# Patient Record
(demographics unavailable — no encounter records)

---

## 2025-05-23 NOTE — PHYSICAL EXAM
[Well Developed] : well developed [Well Nourished] : well nourished [Normal Conjunctiva] : normal conjunctiva [Normal Venous Pressure] : normal venous pressure [Carotid Bruit] : carotid bruit [Normal S1, S2] : normal S1, S2 [No Rub] : no rub [Normal] : no edema, no cyanosis, no clubbing, no varicosities [de-identified] : poor hearing  [de-identified] : 2/6 apical yasmin

## 2025-05-23 NOTE — REVIEW OF SYSTEMS
[Chest Discomfort] : chest discomfort [Negative] : Genitourinary [Headache] : no headache [Feeling Fatigued] : not feeling fatigued [Earache] : no earache [Sore Throat] : no sore throat [SOB] : no shortness of breath [Dyspnea on exertion] : not dyspnea during exertion [Leg Claudication] : no intermittent leg claudication [Orthopnea] : no orthopnea

## 2025-05-23 NOTE — DISCUSSION/SUMMARY
[EKG obtained to assist in diagnosis and management of assessed problem(s)] : EKG obtained to assist in diagnosis and management of assessed problem(s) [FreeTextEntry1] :  Summary : Given the atypical presentation of chest pain in a patient with multiple cardiovascular risk factors, further evaluation is necessary to rule out cardiac causes and identify the underlying etiology of the chest discomfort. - Plan : Reassurance  Lvef 55% 1-2 mr 1- 2 aI  Continue meds for HTN AODM and LIPIDS  f/u heme onc for any possible anemia

## 2025-05-23 NOTE — ADDENDUM
[FreeTextEntry1] : Fernie Billings assisted in documentation on May 23 2025 acting as a scribe for Dr. Andrea Wells.

## 2025-05-23 NOTE — ASSESSMENT
[FreeTextEntry1] : Assessment: - Summary : 83-year-old male with multiple myeloma, hypertension, and diabetes presenting with atypical chest pain. The pain occurs primarily at rest and lasts for several hours, which is not typical for cardiac ischemia. However, given the patient's age and risk factors, cardiac causes cannot be ruled out without further investigation. - Problems : - Atypical chest pain  - Multiple myeloma  - Hypertension  - Diabetes  - History of smoking  - Differential Diagnosis :

## 2025-07-02 NOTE — DISCUSSION/SUMMARY
[FreeTextEntry1] :  Summary : Given the atypical presentation of chest pain in a patient with multiple cardiovascular risk factors, further evaluation is necessary to rule out cardiac causes and identify the underlying etiology of the chest discomfort. - Plan : Reassurance  Lvef 55% 1-2 mr 1- 2 aI  Continue meds for HTN AODM and LIPIDS  f/u heme onc for any possible anemia   [EKG obtained to assist in diagnosis and management of assessed problem(s)] : EKG obtained to assist in diagnosis and management of assessed problem(s)

## 2025-07-02 NOTE — ADDENDUM
[FreeTextEntry1] : Fernie Billings assisted in documentation on Jul 2 2025 acting as a scribe for Dr. Andrea Wells.

## 2025-07-02 NOTE — PHYSICAL EXAM
[Well Developed] : well developed [Well Nourished] : well nourished [Normal Conjunctiva] : normal conjunctiva [Normal Venous Pressure] : normal venous pressure [Carotid Bruit] : carotid bruit [Normal S1, S2] : normal S1, S2 [No Rub] : no rub [Normal] : no edema, no cyanosis, no clubbing, no varicosities [de-identified] : poor hearing  [de-identified] : 2/6 apical yasmin

## 2025-07-02 NOTE — HISTORY OF PRESENT ILLNESS
[FreeTextEntry1] : 5/23/25 MABEL BRIGGS is 83 year he with prior h/o   (+ ) HTN  (+ ) AODM  (- ) smoker  (+ ) lipids (- ) obesity -( ) fam hx of CAD  Subjective: - Summary : 83-year-old male with multiple myeloma, hypertension, and diabetes presenting with chest pain. Patient reports chest discomfort primarily when sitting, lasting for a few hours. No chest pain with exertion. Patient has hearing difficulties. - Chief Complaint (CC) : Chest pain - History of Present Illness (HPI) : The patient is an 83-year-old male with a history of multiple myeloma, hypertension, and diabetes who presents with complaints of chest pain. The chest discomfort occurs primarily when sitting, not associated with physical activity or exertion. The pain typically lasts for a few hours. The patient denies worsening of pain with deep breathing. He reports being able to walk without experiencing chest discomfort. - Past Medical History : - Multiple myeloma  - Hypertension  - Diabetes  - Prostate condition (unspecified)  - Thyroid condition (unspecified)  - Past Surgical History : No reported surgical history - Family History : No reported family history of heart disease - Social History : Former smoker, quit years ago - Review of Systems : Positive for chest pain when sitting. Negative for chest pain with exertion or deep breathing. - Medications : - Velcade injection  - Dexamethasone weekly  - Immunoglobulin  - Iron supplement  - Melatonin  - Darzalex  - Unspecified medications for prostate, thyroid, blood pressure, cholesterol, and diabetes  - Allergies : Allergic reaction to Revlimid (no longer taking) Objective: - Diagnostic Results : EKG Normal - Vital Signs : Not provided in the conversation - Physical Examination (PE) : Not provided in the conversation Assessment: - Summary : 83-year-old male with multiple myeloma, hypertension, and diabetes presenting with atypical chest pain. The pain occurs primarily at rest and lasts for several hours, which is not typical for cardiac ischemia. However, given the patient's age and risk factors, cardiac causes cannot be ruled out without further investigation. - Problems : - Atypical chest pain  - Multiple myeloma  - Hypertension  - Diabetes  - History of smoking  - Differential Diagnosis : - Musculoskeletal chest pain  - Stable angina (atypical presentation)  - Gastroesophageal reflux disease (GERD)  - Anxiety-related chest discomfort  - Medication side effect  - Coronary artery disease (less likely given the atypical presentation, but cannot be ruled out)  Plan: - Summary : Given the atypical presentation of chest pain in a patient with multiple cardiovascular risk factors, further evaluation is necessary to rule out cardiac causes and identify the underlying etiology of the chest discomfort. - Plan : - Order comprehensive blood work including CBC, comprehensive metabolic panel, lipid profile, and cardiac enzymes  - Schedule a stress test to evaluate for underlying coronary artery disease  - Consider chest X-ray to rule out pulmonary causes  - Recommend 24-hour Holter monitor to assess for any cardiac arrhythmias  - Consult with patient's oncologist (Dr. Philip) regarding potential side effects of current myeloma treatment  - Follow up in 2 weeks to review test results and reassess symptoms  - Advise patient to seek immediate medical attention if chest pain worsens, becomes more frequent, or is accompanied by shortness of breath, nausea, or sweating  - Continue current medications as prescribed  - Encourage regular follow-ups with oncology and primary care for management of multiple myeloma, hypertension, and diabetes  7/2/25

## 2025-07-02 NOTE — ASSESSMENT
[FreeTextEntry1] : Lifestyle changes for control of BP reviewed ie wgt reduction, salt restriction, Etoh avoidance, exercise 30 min aerobic activity per day, avoid NSAID use self monitor BP TIW Lifestyle advice for LDL reduction including reduction of red meat consumption concentrating on a plant based diet. 30 min aerobic exercise per day. Calorie reduction to target BMI<25  Assessment: - Summary : 83-year-old male with multiple myeloma, hypertension, and diabetes presenting with atypical chest pain. The pain occurs primarily at rest and lasts for several hours, which is not typical for cardiac ischemia. However, given the patient's age and risk factors, cardiac causes cannot be ruled out without further investigation. - Problems : - Atypical chest pain  - Multiple myeloma  - Hypertension  - Diabetes  - History of smoking  - Differential Diagnosis : .

## 2025-07-15 NOTE — HISTORY OF PRESENT ILLNESS
[FreeTextEntry1] : 5/23/25 MABEL BRIGGS is 83 year he with prior h/o   (+ ) HTN  (+ ) AODM  (- ) smoker  (+ ) lipids (- ) obesity -( ) fam hx of CAD  Subjective: - Summary : 83-year-old male with multiple myeloma, hypertension, and diabetes presenting with chest pain. Patient reports chest discomfort primarily when sitting, lasting for a few hours. No chest pain with exertion. Patient has hearing difficulties. - Chief Complaint (CC) : Chest pain - History of Present Illness (HPI) : The patient is an 83-year-old male with a history of multiple myeloma, hypertension, and diabetes who presents with complaints of chest pain. The chest discomfort occurs primarily when sitting, not associated with physical activity or exertion. The pain typically lasts for a few hours. The patient denies worsening of pain with deep breathing. He reports being able to walk without experiencing chest discomfort. - Past Medical History : - Multiple myeloma  - Hypertension  - Diabetes  - Prostate condition (unspecified)  - Thyroid condition (unspecified)  - Past Surgical History : No reported surgical history - Family History : No reported family history of heart disease - Social History : Former smoker, quit years ago - Review of Systems : Positive for chest pain when sitting. Negative for chest pain with exertion or deep breathing. - Medications : - Velcade injection  - Dexamethasone weekly  - Immunoglobulin  - Iron supplement  - Melatonin  - Darzalex  - Unspecified medications for prostate, thyroid, blood pressure, cholesterol, and diabetes  - Allergies : Allergic reaction to Revlimid (no longer taking) Objective: - Diagnostic Results : EKG Normal - Vital Signs : Not provided in the conversation - Physical Examination (PE) : Not provided in the conversation Assessment: - Summary : 83-year-old male with multiple myeloma, hypertension, and diabetes presenting with atypical chest pain. The pain occurs primarily at rest and lasts for several hours, which is not typical for cardiac ischemia. However, given the patient's age and risk factors, cardiac causes cannot be ruled out without further investigation. - Problems : - Atypical chest pain  - Multiple myeloma  - Hypertension  - Diabetes  - History of smoking  - Differential Diagnosis : - Musculoskeletal chest pain  - Stable angina (atypical presentation)  - Gastroesophageal reflux disease (GERD)  - Anxiety-related chest discomfort  - Medication side effect  - Coronary artery disease (less likely given the atypical presentation, but cannot be ruled out)  Plan: - Summary : Given the atypical presentation of chest pain in a patient with multiple cardiovascular risk factors, further evaluation is necessary to rule out cardiac causes and identify the underlying etiology of the chest discomfort. - Plan : - Order comprehensive blood work including CBC, comprehensive metabolic panel, lipid profile, and cardiac enzymes  - Schedule a stress test to evaluate for underlying coronary artery disease  - Consider chest X-ray to rule out pulmonary causes  - Recommend 24-hour Holter monitor to assess for any cardiac arrhythmias  - Consult with patient's oncologist (Dr. hPilip) regarding potential side effects of current myeloma treatment  - Follow up in 2 weeks to review test results and reassess symptoms  - Advise patient to seek immediate medical attention if chest pain worsens, becomes more frequent, or is accompanied by shortness of breath, nausea, or sweating  - Continue current medications as prescribed  - Encourage regular follow-ups with oncology and primary care for management of multiple myeloma, hypertension, and diabetes  7/2/25  7/15/25 recent CTA mild artifact  Non obstructive CAD  pain is not likely  Ischemic  will see [facility] in [facility]

## 2025-07-15 NOTE — DISCUSSION/SUMMARY
[FreeTextEntry1] :  Summary : Given the atypical presentation of chest pain in a patient with multiple cardiovascular risk factors, further evaluation is necessary to rule out cardiac causes and identify the underlying etiology of the chest discomfort. - Plan : Reassurance  Lvef 55% 1-2 mr 1- 2 aI  Continue meds for HTN AODM and LIPIDS  f/u heme onc for any possible anemia

## 2025-07-15 NOTE — PHYSICAL EXAM
[Well Developed] : well developed [Well Nourished] : well nourished [Normal Conjunctiva] : normal conjunctiva [Normal Venous Pressure] : normal venous pressure [Carotid Bruit] : carotid bruit [Normal S1, S2] : normal S1, S2 [No Rub] : no rub [Normal] : no edema, no cyanosis, no clubbing, no varicosities [de-identified] : poor hearing  [de-identified] : 2/6 apical yasmin

## 2025-07-15 NOTE — ADDENDUM
[FreeTextEntry1] : Fernie Billings assisted in documentation on Jul 15 2025 acting as a scribe for Dr. Andrea Wells.